# Patient Record
Sex: MALE | Race: WHITE | NOT HISPANIC OR LATINO | Employment: FULL TIME | URBAN - METROPOLITAN AREA
[De-identification: names, ages, dates, MRNs, and addresses within clinical notes are randomized per-mention and may not be internally consistent; named-entity substitution may affect disease eponyms.]

---

## 2024-02-09 ENCOUNTER — OFFICE VISIT (OUTPATIENT)
Dept: OPHTHALMOLOGY | Facility: CLINIC | Age: 34
End: 2024-02-09
Payer: COMMERCIAL

## 2024-02-09 DIAGNOSIS — H52.13 MYOPIA OF BOTH EYES WITH ASTIGMATISM: ICD-10-CM

## 2024-02-09 DIAGNOSIS — H52.203 MYOPIA OF BOTH EYES WITH ASTIGMATISM: ICD-10-CM

## 2024-02-09 DIAGNOSIS — Z01.00 ENCOUNTER FOR EYE EXAM: Primary | ICD-10-CM

## 2024-02-09 PROCEDURE — 99499 UNLISTED E&M SERVICE: CPT | Mod: S$GLB,,, | Performed by: OPTOMETRIST

## 2024-02-09 PROCEDURE — 99999 PR PBB SHADOW E&M-NEW PATIENT-LVL II: CPT | Mod: PBBFAC,,, | Performed by: OPTOMETRIST

## 2024-02-09 RX ORDER — TADALAFIL 5 MG/1
5 TABLET ORAL
COMMUNITY
Start: 2023-10-29

## 2024-02-09 NOTE — PROGRESS NOTES
HPI    Vision changes since last eye exam?: No.     Any eye pain today: No.    Other ocular symptoms: Pt has given a OAG suspect status by previous care   provider.     Interested in contact lens fitting today? Yes. Pt is also interested in   updating current eyeglasses as well.      Last edited by Melissa Sloan on 2/9/2024  2:21 PM.            Assessment /Plan     For exam results, see Encounter Report.    Encounter for eye exam  Normal fundus exam.  Crx given, wear full time.    Myopia of both eyes with astigmatism  Eyeglass Final Rx       Eyeglass Final Rx         Sphere Cylinder Axis    Right -3.50 +1.25 092    Left -2.50 +0.75 076      Type: SVL    Expiration Date: 2/9/2025   PD-63                 Contact Lens Final Rx       Final Contact Lens Rx         Brand Base Curve Diameter Sphere Cylinder Axis    Right Dailies Total 1 for Astigmatism 8.6 14.5 -2.50 -0.75 180    Left Dailies Total 1 8.5 14.1 -2.00 DS       Expiration Date: 2/9/2025    Replacement: Daily    Wearing Schedule: Daily Wear                  Retina flat & intact, no holes or tears.  RD precautions reviewed with patient.    Contact lens trials fitted in office today. Contact lens hygiene reviewed. Patient able to insert the lenses themselves with minimal difficulty. Patient ok to finalize Contact lens after 1 week of wear. RTC if still having difficulty with CTL trial after 1 week.       RTC 1 yr for dilated eye exam or sooner if any changes to vision.   Discussed above and answered questions.